# Patient Record
Sex: FEMALE | Race: WHITE | NOT HISPANIC OR LATINO | Employment: OTHER | URBAN - METROPOLITAN AREA
[De-identification: names, ages, dates, MRNs, and addresses within clinical notes are randomized per-mention and may not be internally consistent; named-entity substitution may affect disease eponyms.]

---

## 2019-05-17 ENCOUNTER — HOSPITAL ENCOUNTER (OUTPATIENT)
Facility: MEDICAL CENTER | Age: 84
End: 2019-05-17
Attending: NURSE PRACTITIONER
Payer: COMMERCIAL

## 2019-05-17 ENCOUNTER — OFFICE VISIT (OUTPATIENT)
Dept: URGENT CARE | Facility: CLINIC | Age: 84
End: 2019-05-17
Payer: COMMERCIAL

## 2019-05-17 VITALS
OXYGEN SATURATION: 96 % | WEIGHT: 114 LBS | DIASTOLIC BLOOD PRESSURE: 78 MMHG | TEMPERATURE: 98.4 F | HEART RATE: 75 BPM | RESPIRATION RATE: 16 BRPM | HEIGHT: 60 IN | BODY MASS INDEX: 22.38 KG/M2 | SYSTOLIC BLOOD PRESSURE: 146 MMHG

## 2019-05-17 DIAGNOSIS — R31.9 URINARY TRACT INFECTION WITH HEMATURIA, SITE UNSPECIFIED: Primary | ICD-10-CM

## 2019-05-17 DIAGNOSIS — R39.9 UTI SYMPTOMS: ICD-10-CM

## 2019-05-17 DIAGNOSIS — N39.0 URINARY TRACT INFECTION WITH HEMATURIA, SITE UNSPECIFIED: Primary | ICD-10-CM

## 2019-05-17 LAB
APPEARANCE UR: CLEAR
BILIRUB UR STRIP-MCNC: NORMAL MG/DL
COLOR UR AUTO: YELLOW
GLUCOSE UR STRIP.AUTO-MCNC: NORMAL MG/DL
KETONES UR STRIP.AUTO-MCNC: NORMAL MG/DL
LEUKOCYTE ESTERASE UR QL STRIP.AUTO: NORMAL
NITRITE UR QL STRIP.AUTO: NORMAL
PH UR STRIP.AUTO: 7.5 [PH] (ref 5–8)
PROT UR QL STRIP: NORMAL MG/DL
RBC UR QL AUTO: NORMAL
SP GR UR STRIP.AUTO: 1.01
UROBILINOGEN UR STRIP-MCNC: 0.2 MG/DL

## 2019-05-17 PROCEDURE — 99204 OFFICE O/P NEW MOD 45 MIN: CPT | Performed by: NURSE PRACTITIONER

## 2019-05-17 PROCEDURE — 81002 URINALYSIS NONAUTO W/O SCOPE: CPT | Performed by: NURSE PRACTITIONER

## 2019-05-17 RX ORDER — CEPHALEXIN 500 MG/1
500 CAPSULE ORAL 2 TIMES DAILY
Qty: 14 CAP | Refills: 0 | Status: SHIPPED | OUTPATIENT
Start: 2019-05-17 | End: 2019-05-24

## 2019-05-17 ASSESSMENT — ENCOUNTER SYMPTOMS: EMPTYING BLADDER: 1

## 2019-05-18 LAB
FORWARD REASON: SPWHY: NORMAL
FORWARDED TO LAB: SPWHR: NORMAL
SPECIMEN SENT: SPWT1: NORMAL

## 2019-05-18 ASSESSMENT — ENCOUNTER SYMPTOMS
VOMITING: 0
ABDOMINAL PAIN: 1
NAUSEA: 0
FEVER: 0
SHORTNESS OF BREATH: 0
CARDIOVASCULAR NEGATIVE: 1
RESPIRATORY NEGATIVE: 1
NEUROLOGICAL NEGATIVE: 1
CONSTITUTIONAL NEGATIVE: 1
FLANK PAIN: 0

## 2019-05-18 NOTE — PROGRESS NOTES
Subjective:     Aleah Wiggins is a 84 y.o. female who presents for Cystitis (burning,bleeding,x2 days )        Cystitis    This is a new problem. The problem has been gradually worsening. The quality of the pain is described as burning. The pain is mild. Associated symptoms include hematuria and urgency. Pertinent negatives include no flank pain, nausea or vomiting.     Patient reporting that about 2 days ago she started to experience urinary discomfort. Reports burning on urination and suprapubic abdominal pain. Also reports that this evening she started to notice some blood in her urine which has been starting to subside. Reports a history of UTI.    PMH:  has no past medical history on file.    MEDS:   Current Outpatient Prescriptions:   •  apixaban (ELIQUIS) 2.5mg Tab, Take 2.5 mg by mouth 2 Times a Day., Disp: , Rfl:   •  cephALEXin (KEFLEX) 500 MG Cap, Take 1 Cap by mouth 2 times a day for 7 days., Disp: 14 Cap, Rfl: 0    ALLERGIES: No Known Allergies    SURGHX: History reviewed. No pertinent surgical history.    SOCHX:  reports that she has never smoked. She does not have any smokeless tobacco history on file. She reports that she does not drink alcohol.     FH: Reviewed with patient, not pertinent to this visit.     Review of Systems   Constitutional: Negative.  Negative for fever.   Respiratory: Negative.  Negative for shortness of breath.    Cardiovascular: Negative.    Gastrointestinal: Positive for abdominal pain. Negative for nausea and vomiting.   Genitourinary: Positive for hematuria and urgency. Negative for flank pain.   Neurological: Negative.    All other systems reviewed and are negative.    Objective:     /78 (BP Location: Right arm, Patient Position: Sitting)   Pulse 75   Temp 36.9 °C (98.4 °F) (Temporal)   Resp 16   Ht 1.524 m (5')   Wt 51.7 kg (114 lb)   SpO2 96%   BMI 22.26 kg/m²     Physical Exam   Constitutional: She is oriented to person, place, and time. She appears  well-developed and well-nourished. She is cooperative.  Non-toxic appearance. No distress.   HENT:   Right Ear: External ear normal.   Left Ear: External ear normal.   Nose: Nose normal.   Mouth/Throat: Mucous membranes are normal.   Eyes: Conjunctivae and EOM are normal.   Neck: Normal range of motion.   Cardiovascular: Normal rate, regular rhythm, normal heart sounds and normal pulses.    Pulmonary/Chest: Effort normal and breath sounds normal. No respiratory distress. She has no decreased breath sounds.   Abdominal: Soft. Bowel sounds are normal. There is tenderness in the suprapubic area. There is no CVA tenderness.   Musculoskeletal: Normal range of motion. She exhibits no deformity.   Neurological: She is alert and oriented to person, place, and time. She has normal strength. No sensory deficit.   Skin: Skin is warm, dry and intact. Capillary refill takes less than 2 seconds.   Psychiatric: She has a normal mood and affect. Her behavior is normal.   Vitals reviewed.    UA:    Component Results     Component Value Ref Range & Units Status   POC Color YELLOW  Negative Final   POC Appearance CLEAR  Negative Final   POC Leukocyte Esterase MODERATE  Negative Final   POC Nitrites NEG  Negative Final   POC Urobiligen 0.2  Negative (0.2) mg/dL Final   POC Protein NEG  Negative mg/dL Final   POC Urine PH 7.5  5.0 - 8.0 Final   POC Blood LARGE  Negative Final   POC Specific Gravity 1.010  <1.005 - >1.030 Final   POC Ketones NEG  Negative mg/dL Final   POC Bilirubin NEG  Negative mg/dL Final   POC Glucose NEG  Negative mg/dL Final        Assessment/Plan:     1. Urinary tract infection with hematuria, site unspecified  - URINE CULTURE(NEW); Future  - cephALEXin (KEFLEX) 500 MG Cap; Take 1 Cap by mouth 2 times a day for 7 days.  Dispense: 14 Cap; Refill: 0    2. UTI symptoms  - POCT Urinalysis    UA positive for large blood and moderate LE. Patient reports of dysuria and suprapubic discomfort. Rx given for Keflex. Urine  culture pending. Discussed close monitoring and supportive measures including increasing fluids and rest as well as OTC symptom management including acetaminophen and/or ibuprofen PRN pain and/or fever.     Patient advised to: Return if symptoms worsen or fail to improve, for 1) Symptoms don't improve or worsen, or go to ER, 2) Follow up with primary care in 7-10 days.    Differential diagnosis, natural history, supportive care, and indications for immediate follow-up discussed. All questions answered. Patient agrees with the plan of care.

## 2021-01-14 DIAGNOSIS — Z23 NEED FOR VACCINATION: ICD-10-CM

## 2023-08-17 ENCOUNTER — HOSPITAL ENCOUNTER (EMERGENCY)
Facility: MEDICAL CENTER | Age: 88
End: 2023-08-18
Attending: EMERGENCY MEDICINE | Admitting: STUDENT IN AN ORGANIZED HEALTH CARE EDUCATION/TRAINING PROGRAM
Payer: COMMERCIAL

## 2023-08-17 DIAGNOSIS — R32 URINARY INCONTINENCE, UNSPECIFIED TYPE: ICD-10-CM

## 2023-08-17 DIAGNOSIS — R53.1 GENERALIZED WEAKNESS: ICD-10-CM

## 2023-08-17 PROCEDURE — 84484 ASSAY OF TROPONIN QUANT: CPT

## 2023-08-17 PROCEDURE — 36415 COLL VENOUS BLD VENIPUNCTURE: CPT

## 2023-08-17 PROCEDURE — 85610 PROTHROMBIN TIME: CPT

## 2023-08-17 PROCEDURE — 99285 EMERGENCY DEPT VISIT HI MDM: CPT

## 2023-08-17 PROCEDURE — 83880 ASSAY OF NATRIURETIC PEPTIDE: CPT

## 2023-08-17 PROCEDURE — 83690 ASSAY OF LIPASE: CPT

## 2023-08-17 PROCEDURE — 85730 THROMBOPLASTIN TIME PARTIAL: CPT

## 2023-08-17 PROCEDURE — 85025 COMPLETE CBC W/AUTO DIFF WBC: CPT

## 2023-08-17 PROCEDURE — 84443 ASSAY THYROID STIM HORMONE: CPT

## 2023-08-17 PROCEDURE — 80053 COMPREHEN METABOLIC PANEL: CPT

## 2023-08-18 ENCOUNTER — APPOINTMENT (OUTPATIENT)
Dept: RADIOLOGY | Facility: MEDICAL CENTER | Age: 88
End: 2023-08-18
Attending: EMERGENCY MEDICINE
Payer: COMMERCIAL

## 2023-08-18 VITALS
SYSTOLIC BLOOD PRESSURE: 98 MMHG | RESPIRATION RATE: 16 BRPM | OXYGEN SATURATION: 97 % | HEART RATE: 77 BPM | DIASTOLIC BLOOD PRESSURE: 68 MMHG | HEIGHT: 58 IN | TEMPERATURE: 97.2 F | WEIGHT: 121.69 LBS | BODY MASS INDEX: 25.54 KG/M2

## 2023-08-18 PROBLEM — R53.1 WEAKNESS: Status: ACTIVE | Noted: 2023-08-18

## 2023-08-18 LAB
ALBUMIN SERPL BCP-MCNC: 4.1 G/DL (ref 3.2–4.9)
ALBUMIN/GLOB SERPL: 1.5 G/DL
ALP SERPL-CCNC: 77 U/L (ref 30–99)
ALT SERPL-CCNC: 16 U/L (ref 2–50)
ANION GAP SERPL CALC-SCNC: 12 MMOL/L (ref 7–16)
APPEARANCE UR: CLEAR
APTT PPP: 27.6 SEC (ref 24.7–36)
AST SERPL-CCNC: 19 U/L (ref 12–45)
BASOPHILS # BLD AUTO: 0.3 % (ref 0–1.8)
BASOPHILS # BLD: 0.02 K/UL (ref 0–0.12)
BILIRUB SERPL-MCNC: 0.3 MG/DL (ref 0.1–1.5)
BILIRUB UR QL STRIP.AUTO: NEGATIVE
BUN SERPL-MCNC: 21 MG/DL (ref 8–22)
CALCIUM ALBUM COR SERPL-MCNC: 9.5 MG/DL (ref 8.5–10.5)
CALCIUM SERPL-MCNC: 9.6 MG/DL (ref 8.5–10.5)
CHLORIDE SERPL-SCNC: 106 MMOL/L (ref 96–112)
CO2 SERPL-SCNC: 25 MMOL/L (ref 20–33)
COLOR UR: YELLOW
CREAT SERPL-MCNC: 0.82 MG/DL (ref 0.5–1.4)
EKG IMPRESSION: NORMAL
EOSINOPHIL # BLD AUTO: 0.06 K/UL (ref 0–0.51)
EOSINOPHIL NFR BLD: 1 % (ref 0–6.9)
ERYTHROCYTE [DISTWIDTH] IN BLOOD BY AUTOMATED COUNT: 50.1 FL (ref 35.9–50)
GFR SERPLBLD CREATININE-BSD FMLA CKD-EPI: 68 ML/MIN/1.73 M 2
GLOBULIN SER CALC-MCNC: 2.7 G/DL (ref 1.9–3.5)
GLUCOSE SERPL-MCNC: 94 MG/DL (ref 65–99)
GLUCOSE UR STRIP.AUTO-MCNC: NEGATIVE MG/DL
HCT VFR BLD AUTO: 38.3 % (ref 37–47)
HGB BLD-MCNC: 12.3 G/DL (ref 12–16)
IMM GRANULOCYTES # BLD AUTO: 0.01 K/UL (ref 0–0.11)
IMM GRANULOCYTES NFR BLD AUTO: 0.2 % (ref 0–0.9)
INR PPP: 1.05 (ref 0.87–1.13)
KETONES UR STRIP.AUTO-MCNC: NEGATIVE MG/DL
LACTATE SERPL-SCNC: 0.8 MMOL/L (ref 0.5–2)
LEUKOCYTE ESTERASE UR QL STRIP.AUTO: NEGATIVE
LIPASE SERPL-CCNC: 28 U/L (ref 11–82)
LIPASE SERPL-CCNC: 30 U/L (ref 11–82)
LYMPHOCYTES # BLD AUTO: 1.59 K/UL (ref 1–4.8)
LYMPHOCYTES NFR BLD: 26.2 % (ref 22–41)
MCH RBC QN AUTO: 29.6 PG (ref 27–33)
MCHC RBC AUTO-ENTMCNC: 32.1 G/DL (ref 32.2–35.5)
MCV RBC AUTO: 92.3 FL (ref 81.4–97.8)
MICRO URNS: NORMAL
MONOCYTES # BLD AUTO: 0.67 K/UL (ref 0–0.85)
MONOCYTES NFR BLD AUTO: 11.1 % (ref 0–13.4)
NEUTROPHILS # BLD AUTO: 3.71 K/UL (ref 1.82–7.42)
NEUTROPHILS NFR BLD: 61.2 % (ref 44–72)
NITRITE UR QL STRIP.AUTO: NEGATIVE
NRBC # BLD AUTO: 0 K/UL
NRBC BLD-RTO: 0 /100 WBC (ref 0–0.2)
NT-PROBNP SERPL IA-MCNC: 1372 PG/ML (ref 0–125)
NT-PROBNP SERPL IA-MCNC: 1504 PG/ML (ref 0–125)
PH UR STRIP.AUTO: 6.5 [PH] (ref 5–8)
PLATELET # BLD AUTO: 216 K/UL (ref 164–446)
PMV BLD AUTO: 11.5 FL (ref 9–12.9)
POTASSIUM SERPL-SCNC: 4.3 MMOL/L (ref 3.6–5.5)
PROT SERPL-MCNC: 6.8 G/DL (ref 6–8.2)
PROT UR QL STRIP: NEGATIVE MG/DL
PROTHROMBIN TIME: 13.6 SEC (ref 12–14.6)
RBC # BLD AUTO: 4.15 M/UL (ref 4.2–5.4)
RBC UR QL AUTO: NEGATIVE
SODIUM SERPL-SCNC: 143 MMOL/L (ref 135–145)
SP GR UR STRIP.AUTO: 1.01
TROPONIN T SERPL-MCNC: 19 NG/L (ref 6–19)
TROPONIN T SERPL-MCNC: 20 NG/L (ref 6–19)
TSH SERPL DL<=0.005 MIU/L-ACNC: 1.55 UIU/ML (ref 0.38–5.33)
UROBILINOGEN UR STRIP.AUTO-MCNC: 0.2 MG/DL
WBC # BLD AUTO: 6.1 K/UL (ref 4.8–10.8)

## 2023-08-18 PROCEDURE — 87040 BLOOD CULTURE FOR BACTERIA: CPT | Mod: 91

## 2023-08-18 PROCEDURE — 97166 OT EVAL MOD COMPLEX 45 MIN: CPT

## 2023-08-18 PROCEDURE — 83690 ASSAY OF LIPASE: CPT

## 2023-08-18 PROCEDURE — 93005 ELECTROCARDIOGRAM TRACING: CPT | Performed by: EMERGENCY MEDICINE

## 2023-08-18 PROCEDURE — 81003 URINALYSIS AUTO W/O SCOPE: CPT

## 2023-08-18 PROCEDURE — 97163 PT EVAL HIGH COMPLEX 45 MIN: CPT

## 2023-08-18 PROCEDURE — 99282 EMERGENCY DEPT VISIT SF MDM: CPT | Performed by: STUDENT IN AN ORGANIZED HEALTH CARE EDUCATION/TRAINING PROGRAM

## 2023-08-18 PROCEDURE — 84484 ASSAY OF TROPONIN QUANT: CPT

## 2023-08-18 PROCEDURE — 83605 ASSAY OF LACTIC ACID: CPT

## 2023-08-18 PROCEDURE — 71045 X-RAY EXAM CHEST 1 VIEW: CPT

## 2023-08-18 PROCEDURE — 83880 ASSAY OF NATRIURETIC PEPTIDE: CPT

## 2023-08-18 PROCEDURE — 70450 CT HEAD/BRAIN W/O DYE: CPT

## 2023-08-18 RX ORDER — FUROSEMIDE 20 MG/1
20 TABLET ORAL DAILY
COMMUNITY

## 2023-08-18 RX ORDER — ASPIRIN 81 MG/1
81 TABLET ORAL
COMMUNITY

## 2023-08-18 RX ORDER — POTASSIUM CHLORIDE 750 MG/1
10 TABLET, EXTENDED RELEASE ORAL DAILY
COMMUNITY

## 2023-08-18 RX ORDER — ATORVASTATIN CALCIUM 20 MG/1
20 TABLET, FILM COATED ORAL NIGHTLY
COMMUNITY

## 2023-08-18 ASSESSMENT — COGNITIVE AND FUNCTIONAL STATUS - GENERAL
EATING MEALS: A LITTLE
CLIMB 3 TO 5 STEPS WITH RAILING: A LITTLE
WALKING IN HOSPITAL ROOM: A LITTLE
TURNING FROM BACK TO SIDE WHILE IN FLAT BAD: A LITTLE
HELP NEEDED FOR BATHING: A LOT
SUGGESTED CMS G CODE MODIFIER MOBILITY: CK
MOBILITY SCORE: 18
MOVING FROM LYING ON BACK TO SITTING ON SIDE OF FLAT BED: A LITTLE
SUGGESTED CMS G CODE MODIFIER DAILY ACTIVITY: CK
DRESSING REGULAR UPPER BODY CLOTHING: A LITTLE
PERSONAL GROOMING: A LITTLE
DRESSING REGULAR LOWER BODY CLOTHING: A LOT
MOVING TO AND FROM BED TO CHAIR: A LITTLE
TOILETING: A LOT
DAILY ACTIVITIY SCORE: 15
STANDING UP FROM CHAIR USING ARMS: A LITTLE

## 2023-08-18 ASSESSMENT — GAIT ASSESSMENTS
DEVIATION: BRADYKINETIC
DISTANCE (FEET): 100
ASSISTIVE DEVICE: FRONT WHEEL WALKER
GAIT LEVEL OF ASSIST: STANDBY ASSIST

## 2023-08-18 NOTE — DISCHARGE PLANNING
MSW voalted PT/OT to come see pt. MSW called pt's  Baljinder. He will be the ER around 10:30. MSW will meet with Baljinder when he arrives to discuss d/c plan.

## 2023-08-18 NOTE — ED PROVIDER NOTES
ED Provider Note    CHIEF COMPLAINT  Chief Complaint   Patient presents with    Incontinence     Pt states she has lost all control of her bladder x2-3 days.     Weakness     Progressively increasing weakness over the past 3 months.         EXTERNAL RECORDS REVIEWED  Outpatient Notes Office note 5/17/19    HPI/ROS  LIMITATION TO HISTORY   Select: : None  OUTSIDE HISTORIAN(S):  Significant other     Aleah Wiggins is a 89 y.o. female who presents to the emergency department for evaluation of incontinence and weakness.  History is mainly obtained from the patient's  who is at bedside as she does have a history of dementia and her baseline mental status is alert and oriented x1.  The  states that over the last 1 to 2 weeks the patient has been having urinary incontinence.  She has been having generalized fatigue and weakness as well.  He has not noticed that she has had any fevers.  She has not complained of chest pain or shortness of breath.  He denies any focal neurodeficits.  She does have a history of strokes and was initially on blood thinners but now has a watchman.  He denies any traumatic injuries or falls.  She has not had any vomiting or diarrhea.  She has not had any coughing or wheezing.    PAST MEDICAL HISTORY  CVA s/p watchman    SURGICAL HISTORY  patient denies any surgical history    FAMILY HISTORY  No family history on file.    SOCIAL HISTORY  Social History     Tobacco Use    Smoking status: Never    Smokeless tobacco: Not on file   Substance and Sexual Activity    Alcohol use: No    Drug use: Not on file    Sexual activity: Not on file     CURRENT MEDICATIONS  Home Medications       Reviewed by Ashlee Matos R.N. (Registered Nurse) on 08/17/23 at 2027  Med List Status: Partial     Medication Last Dose Status   apixaban (ELIQUIS) 2.5mg Tab  Active                  ALLERGIES  No Known Allergies    PHYSICAL EXAM  VITAL SIGNS: BP (!) 162/76   Pulse 69   Temp 36.3 °C (97.4 °F)  "(Temporal)   Resp 18   Ht 1.473 m (4' 10\")   Wt 55.2 kg (121 lb 11.1 oz)   SpO2 97%   BMI 25.43 kg/m²   Constitutional: Alert and in no apparent distress.  HENT: Normocephalic atraumatic. Bilateral external ears normal. Nose normal. Mucous membranes are moist.  Eyes: Pupils are equal and reactive. Conjunctiva normal. Non-icteric sclera.   Neck: Normal range of motion without tenderness. Supple. No meningeal signs.  Cardiovascular: Regular rate and rhythm. No murmurs, gallops or rubs.  Thorax & Lungs: No retractions, nasal flaring, or tachypnea. Breath sounds are clear to auscultation bilaterally. No wheezing, rhonchi or rales.  Abdomen: Soft, nontender and nondistended. No hepatosplenomegaly.  Skin: Warm and dry. No rashes are noted.  Back: No bony tenderness, No CVA tenderness.   Extremities: 2+ peripheral pulses. Cap refill is less than 2 seconds. No edema, cyanosis, or clubbing.  Musculoskeletal: Good range of motion in all major joints. No tenderness to palpation or major deformities noted.   Neurologic: Alert and oriented x1. The patient moves all 4 extremities without obvious deficits.    DIAGNOSTIC STUDIES / PROCEDURES  EKG  I have independently interpreted this EKG  Results for orders placed or performed during the hospital encounter of 23   EKG (NOW)   Result Value Ref Range    Report       [facility] Emergency Dept.    Test Date:  2023  Pt Name:    CARSON LUKE                  Department: ER  MRN:        [id number]                      Room:       [facility]  Gender:     Female                       Technician: [id number]  :        1934                   Requested By:TILA LOW  Order #:    [id number]                    Reading MD: Tila Low    Measurements  Intervals                                Axis  Rate:       63                           P:          0  NV:         0                            QRS:        16  QRSD:       76                           T:          " 31  QT:         415  QTc:        425    Interpretive Statements  Atrial fibrillation  Left ventricular hypertrophy  Anterior Q waves, possibly due to LVH  No previous ECG available for comparison  Electronically Signed On 08- 01:56:12 PDT by Tila Colmenares       LABS  Results for orders placed or performed during the hospital encounter of 08/17/23   CBC WITH DIFFERENTIAL   Result Value Ref Range    WBC 6.1 4.8 - 10.8 K/uL    RBC 4.15 (L) 4.20 - 5.40 M/uL    Hemoglobin 12.3 12.0 - 16.0 g/dL    Hematocrit 38.3 37.0 - 47.0 %    MCV 92.3 81.4 - 97.8 fL    MCH 29.6 27.0 - 33.0 pg    MCHC 32.1 (L) 32.2 - 35.5 g/dL    RDW 50.1 (H) 35.9 - 50.0 fL    Platelet Count 216 164 - 446 K/uL    MPV 11.5 9.0 - 12.9 fL    Neutrophils-Polys 61.20 44.00 - 72.00 %    Lymphocytes 26.20 22.00 - 41.00 %    Monocytes 11.10 0.00 - 13.40 %    Eosinophils 1.00 0.00 - 6.90 %    Basophils 0.30 0.00 - 1.80 %    Immature Granulocytes 0.20 0.00 - 0.90 %    Nucleated RBC 0.00 0.00 - 0.20 /100 WBC    Neutrophils (Absolute) 3.71 1.82 - 7.42 K/uL    Lymphs (Absolute) 1.59 1.00 - 4.80 K/uL    Monos (Absolute) 0.67 0.00 - 0.85 K/uL    Eos (Absolute) 0.06 0.00 - 0.51 K/uL    Baso (Absolute) 0.02 0.00 - 0.12 K/uL    Immature Granulocytes (abs) 0.01 0.00 - 0.11 K/uL    NRBC (Absolute) 0.00 K/uL   COMP METABOLIC PANEL   Result Value Ref Range    Sodium 143 135 - 145 mmol/L    Potassium 4.3 3.6 - 5.5 mmol/L    Chloride 106 96 - 112 mmol/L    Co2 25 20 - 33 mmol/L    Anion Gap 12.0 7.0 - 16.0    Glucose 94 65 - 99 mg/dL    Bun 21 8 - 22 mg/dL    Creatinine 0.82 0.50 - 1.40 mg/dL    Calcium 9.6 8.5 - 10.5 mg/dL    Correct Calcium 9.5 8.5 - 10.5 mg/dL    AST(SGOT) 19 12 - 45 U/L    ALT(SGPT) 16 2 - 50 U/L    Alkaline Phosphatase 77 30 - 99 U/L    Total Bilirubin 0.3 0.1 - 1.5 mg/dL    Albumin 4.1 3.2 - 4.9 g/dL    Total Protein 6.8 6.0 - 8.2 g/dL    Globulin 2.7 1.9 - 3.5 g/dL    A-G Ratio 1.5 g/dL   TSH   Result Value Ref Range    TSH 1.550 0.380 - 5.330  uIU/mL   LACTIC ACID   Result Value Ref Range    Lactic Acid 0.8 0.5 - 2.0 mmol/L   URINALYSIS CULTURE, IF INDICATED    Specimen: Urine, Cath   Result Value Ref Range    Color Yellow     Character Clear     Specific Gravity 1.010 <1.035    Ph 6.5 5.0 - 8.0    Glucose Negative Negative mg/dL    Ketones Negative Negative mg/dL    Protein Negative Negative mg/dL    Bilirubin Negative Negative    Urobilinogen, Urine 0.2 Negative    Nitrite Negative Negative    Leukocyte Esterase Negative Negative    Occult Blood Negative Negative    Micro Urine Req see below    PROTHROMBIN TIME (INR)   Result Value Ref Range    PT 13.6 12.0 - 14.6 sec    INR 1.05 0.87 - 1.13   APTT   Result Value Ref Range    APTT 27.6 24.7 - 36.0 sec   LIPASE   Result Value Ref Range    Lipase 30 11 - 82 U/L   proBrain Natriuretic Peptide, NT   Result Value Ref Range    NT-proBNP 1504 (H) 0 - 125 pg/mL   TROPONIN   Result Value Ref Range    Troponin T 19 6 - 19 ng/L   ESTIMATED GFR   Result Value Ref Range    GFR (CKD-EPI) 68 >60 mL/min/1.73 m 2   EKG (NOW)   Result Value Ref Range    Report       [facility] Emergency Dept.    Test Date:  2023  Pt Name:    CARSON LUKE                  Department: ER  MRN:        [id number]                      Room:       [facility]  Gender:     Female                       Technician: [id number]  :        1934                   Requested By:TILA LOW  Order #:    [id number]                    Reading MD: Tila Low    Measurements  Intervals                                Axis  Rate:       63                           P:          0  OH:         0                            QRS:        16  QRSD:       76                           T:          31  QT:         415  QTc:        425    Interpretive Statements  Atrial fibrillation  Left ventricular hypertrophy  Anterior Q waves, possibly due to LVH  No previous ECG available for comparison  Electronically Signed On 2023 01:56:12 PDT by Tila Low        RADIOLOGY  I have independently interpreted the diagnostic imaging associated with this visit and am waiting the final reading from the radiologist.   My preliminary interpretation is as follows: No intracranial hemorrhage noted.  Radiologist interpretation:   CT-HEAD W/O   Final Result         1.  No acute intracranial abnormality is identified, there are nonspecific white matter changes, commonly associated with small vessel ischemic disease.  Associated mild cerebral atrophy is noted.   2.  Atherosclerosis.         DX-CHEST-PORTABLE (1 VIEW)   Final Result         1.  No acute cardiopulmonary disease.   2.  Cardiomegaly   3.  Atherosclerosis        COURSE & MEDICAL DECISION MAKING    ED Observation Status? Yes; I am placing the patient in to an observation status due to a diagnostic uncertainty as well as therapeutic intensity. Patient placed in observation status at 12:09 AM, 8/18/2023.     Observation plan is as follows: EKG, labs, imaging and reassessment    INITIAL ASSESSMENT, COURSE AND PLAN  Care Narrative: This is an 89-year-old female presenting to the emergency department for urinary incontinence and generalized weakness.  On initial evaluation, the patient did not appear to be in any acute distress.  Her vital signs were normal and reassuring.  Physical exam was overall reassuring.  She moves all 4 extremities equally and had no focal deficits concerning for stroke.  She was alert and oriented x1 but has been stated that this is at her baseline.  No other abnormalities were noted.    EKG did not show any evidence of acute ischemia.  Troponin was normal and she denied any chest pain or shortness of breath.  I have extremely low clinical suspicion for ACS at this point.    BNP was elevated but no evidence of pulmonary edema was noted on chest x-ray and she had no evidence of hypoxia on her vital signs.  I am less concern for CHF.    White count and lactic acid were normal.  She had no history of  fevers.  I am less concern for sepsis or serious bacterial infection.    Urinalysis was actually completely clean with no evidence of infection or blood.    Electrolytes were completely normal with no derangements.  H&H were normal with no evidence of anemia.    CT head was clear with no evidence of intracranial hemorrhage or mass.    2:34 AM - I discussed the case with Dr Anna, hospitalist. The plan was made to place the patient in ED obs and get a PT/OT consult in the ED.     3:07 AM - The patient was signed out to MIN Gonsales, pending PT/OT evaluation in the morning.    ADDITIONAL PROBLEM LIST  Generalized weakness, urinary incontinence  DISPOSITION AND DISCUSSIONS  I have discussed management of the patient with the following physicians and BRADEN's:  Dr Anna, hospitalist.     Discussion of management with other QHP or appropriate source(s): None     Escalation of care considered, and ultimately not performed:blood analysis and diagnostic imaging    Barriers to care at this time, including but not limited to:  None .     Decision tools and prescription drugs considered including, but not limited to:  None .    FINAL IMPRESSION  1. Generalized weakness      Electronically signed by: Tila Colmenares D.O., 8/17/2023 11:48 PM

## 2023-08-18 NOTE — ED TRIAGE NOTES
"Chief Complaint   Patient presents with    Incontinence     Pt states she has lost all control of her bladder x2-3 days.     Weakness     Progressively increasing weakness over the past 3 months.       Pt ambulatory to triage with above complaints. Pt is accompanied by her  and caretaker. Pt is a poor historian. Pt is intermittently confused during triage, which is baseline per  since pt's stroke in 2017.  states pt has never had issues with incontinence before. Pt denies any abdominal pain, burning, or foul odor. Pt and  state pt is becoming more weak over the past 3 months.     Protocol ordered. Pt educated on triage process, placed back in lobby, and instructed to inform staff of any changes.     /65   Pulse 69   Temp 36.4 °C (97.6 °F) (Temporal)   Resp 14   Ht 1.473 m (4' 10\")   Wt 55.2 kg (121 lb 11.1 oz)   SpO2 98%   BMI 25.43 kg/m²     "

## 2023-08-18 NOTE — DISCHARGE PLANNING
MSW spoke to PT/OT. MSW met with pt's  to discuss PT/OT eval and options.Pt does not qualify for SNF. Possibly Home Health.  Per pt's  they just sold their home and are moving to [place] in 2 weeks. Pt's PCP is out of [place] as well. Pt's  stated he already has been looking at assisted livings in [place] and will pursue placement when they get there. MSW offered resources and placement options but per  since they are moving it would not benefit them. They do have relatives coming to assist with the move as well. Pt's  agreeable to take pt home in the mean time and care for her till they go to [place]. MSW updated ERP on conversation with pt's .

## 2023-08-18 NOTE — ED NOTES
Pt to restroom with  assisting, unable to provide urine sample at this time but aware of need for sample.

## 2023-08-18 NOTE — DISCHARGE INSTRUCTIONS
You were seen in the ER for weakness and incontinence.  Our hospital doctor evaluated you and did not feel that there was any indication for admission.  Given that you are moving from the area in 2 weeks her  was unable to find placement for you.  You have indicated that you are comfortable going home with your  and I have discharged you.  However, if you have any new or worsening symptoms I would like you to come back immediately to the ER.

## 2023-08-18 NOTE — CONSULTS
Hospital Medicine Consultation    Date of Service  8/18/2023    Referring Physician  Tila Colmenares D.O.    Consulting Physician  Sourav Anna M.D.    Reason for Consultation  Weakness and placement  History of Presenting Illness  89 y.o. female with reported cognitive decline alert, awake, oriented to self only who presented 8/17/2023 with weakness and incontinence.  History is obtained entirely from chart review as patient is poor historian and only oriented to self.  In the ER, work-up did not reveal any indication for admission.  UA negative.  Hospital medicine consulted for weakness and subsequent placement.  PT/OT ordered by ER physician.    I discussed with case management patient does not meet criteria for inpatient hospitalization.  Patient remained under ED observation under ER team.    Review of Systems  Review of Systems   Unable to perform ROS: Dementia       Past Medical History   has no past medical history on file.    Surgical History   has no past surgical history on file.    Family History  family history is not on file.    Social History   reports that she has never smoked. She does not have any smokeless tobacco history on file. She reports that she does not drink alcohol.          Allergies  No Known Allergies    Physical Exam  Temp:  [36.3 °C (97.4 °F)-36.4 °C (97.6 °F)] 36.3 °C (97.4 °F)  Pulse:  [69-72] 69  Resp:  [14-18] 18  BP: (133-162)/(65-83) 162/76  SpO2:  [97 %-98 %] 97 %    Physical Exam  Vitals and nursing note reviewed.   HENT:      Head: Normocephalic and atraumatic.      Right Ear: Tympanic membrane normal.      Left Ear: Tympanic membrane normal.      Nose: Nose normal.      Mouth/Throat:      Mouth: Mucous membranes are moist.      Pharynx: Oropharynx is clear.   Eyes:      Extraocular Movements: Extraocular movements intact.      Pupils: Pupils are equal, round, and reactive to light.   Cardiovascular:      Rate and Rhythm: Normal rate and regular rhythm.      Pulses: Normal  pulses.      Heart sounds: Normal heart sounds.   Pulmonary:      Effort: Pulmonary effort is normal.      Breath sounds: Normal breath sounds.   Abdominal:      General: Bowel sounds are normal. There is no distension.      Palpations: Abdomen is soft. There is no mass.   Musculoskeletal:         General: Normal range of motion.      Cervical back: Neck supple.   Skin:     General: Skin is warm.      Capillary Refill: Capillary refill takes less than 2 seconds.   Neurological:      Mental Status: She is alert. Mental status is at baseline. She is disoriented.      Comments: Only oriented to self   Psychiatric:         Mood and Affect: Mood normal.         Behavior: Behavior normal.         Fluids      Laboratory  Recent Labs     08/17/23  2356   WBC 6.1   RBC 4.15*   HEMOGLOBIN 12.3   HEMATOCRIT 38.3   MCV 92.3   MCH 29.6   MCHC 32.1*   RDW 50.1*   PLATELETCT 216   MPV 11.5     Recent Labs     08/17/23  2356   SODIUM 143   POTASSIUM 4.3   CHLORIDE 106   CO2 25   GLUCOSE 94   BUN 21   CREATININE 0.82   CALCIUM 9.6     Recent Labs     08/17/23  2356   APTT 27.6   INR 1.05                 Imaging  CT-HEAD W/O   Final Result         1.  No acute intracranial abnormality is identified, there are nonspecific white matter changes, commonly associated with small vessel ischemic disease.  Associated mild cerebral atrophy is noted.   2.  Atherosclerosis.         DX-CHEST-PORTABLE (1 VIEW)   Final Result         1.  No acute cardiopulmonary disease.   2.  Cardiomegaly   3.  Atherosclerosis          Assessment/Plan  Weakness  Assessment & Plan  CT head negative for acute intracranial mass or bleed.  Chest x-ray negative for acute consolidation or effusion.  UA negative.  No indication for inpatient hospitalization.  Discussed with case management.  Patient remained under ED abs in the ER team.  PT/OT  Case management social work to be consulted by ER team for placement if needed

## 2023-08-18 NOTE — THERAPY
"Occupational Therapy   Initial Evaluation     Patient Name: Aleah Wiggins  Age:  89 y.o., Sex:  female  Medical Record #: [id number]  Today's Date: 8/18/2023     Precautions  Precautions: Fall Risk    Assessment  Patient is an 89 y.o. female who presented to the ED with weakness, incontinence and cognitive decline. Pt with a h/o dementia and is A&O x 1 at baseline. During OT eval, pt with intact strength, balance and coordination. Pt limited by cognition. Attempted to assess toileting during OT eval, but pt declined to sit on the toilet after walking to the bathroom and appeared concerned about privacy and using a public bathroom. Upon questioning if she felt the urge to pee, she responded with \"I want a small dog.\" At this time, pt with no skilled OT needs in the acute care setting.     Patient will not be actively followed for occupational therapy services at this time, however may be seen if requested by physician for 1 more visit during this hospital stay to address any discharge or equipment needs.       Plan    Occupational Therapy Initial Treatment Plan   Duration: Discharge Needs Only    DC Equipment Recommendations: Unable to determine at this time  Discharge Recommendations: Recommend home health for continued occupational therapy services (to help with equipment recommendations and caregiver training)     Subjective    Very pleasant and agreeable     Objective       08/18/23 0955   Prior Living Situation   Housing / Facility 1 Story House   Lives with - Patient's Self Care Capacity Spouse   Comments Pt unable to provide information on living situation or PLOF as she is A&O x 1. Spouse will be in today to meet with SW   Prior Level of ADL Function   Comments unable to determine   Precautions   Precautions Fall Risk   Vitals   O2 Delivery Device None - Room Air   Pain   Pain Scales 0 to 10 Scale    Pain 0 - 10 Group   Therapist Pain Assessment During Activity;Nurse Notified  (denies any pain)   Cognition  " "  Cognition / Consciousness X   Comments oriented to person. When asked other orientation questions, she provided answers such as \"I grew up in [place]\" or \"I want a small dog\"   Active ROM Upper Body   Active ROM Upper Body  WDL   Dominant Hand Right   Strength Upper Body   Upper Body Strength  WDL   Upper Body Muscle Tone   Upper Body Muscle Tone  WDL   Coordination Upper Body   Coordination WDL   Balance Assessment   Sitting Balance (Static) Fair +   Sitting Balance (Dynamic) Fair   Standing Balance (Static) Fair   Standing Balance (Dynamic) Fair   Weight Shift Sitting Fair   Weight Shift Standing Fair   Bed Mobility    Supine to Sit Minimal Assist  (extra time)   Scooting Standby Assist  (extra time)   ADL Assessment   Eating Supervision  (set-up)   Upper Body Dressing Minimal Assist  (donned her jacket for walking in the green)   Lower Body Dressing Maximal Assist  (assist to don shoes but able to doff with supervision)   Toileting   (walked to BR but then refused to use toilet due to unfamiliar environment/public restroom)   6 Clicks Daily Activity Score 15   Functional Mobility   Sit to Stand Standby Assist   Bed, Chair, Wheelchair Transfer Standby Assist   Mobility walked to/from hallway bathroom with FWW, no LOB noted, no assist required   Education Group   Education Provided Role of Occupational Therapist   Role of Occupational Therapist Patient Response Patient;Acceptance;Explanation;No Learning Evidence   Occupational Therapy Initial Treatment Plan    Duration Discharge Needs Only   Problem List   Problem List Impaired Cognitive Function                   "

## 2023-08-18 NOTE — ASSESSMENT & PLAN NOTE
CT head negative for acute intracranial mass or bleed.  Chest x-ray negative for acute consolidation or effusion.  UA negative.  No indication for inpatient hospitalization.  Discussed with case management.  Patient remained under ED obs in the ER team.  PT/OT  Case management social work to be consulted by ER team for placement if needed

## 2023-08-18 NOTE — ED NOTES
Bedside report received from Eri YEUNG. POC discussed with patient. No needs at this time.      Called pt and lvm letting her know referral was received and office would like to schedule her an appointment. Asked pt to please call us back.

## 2023-08-18 NOTE — DISCHARGE SUMMARY
"  ED Observation Discharge Summary    Patient:Aleah Wiggins  Patient : 1934  Patient MRN: [id number]  Patient PCP: Pcp Pt States None    Admit Date: 2023  Discharge Date and Time: 23 11:40 AM  Discharge Diagnosis:   1. Generalized weakness        2. Urinary incontinence, unspecified type        Discharge Attending: Julio Bellamy M.D.  Discharge Service: ED Observation    ED Course  Aleah Wiggins is a 89 y.o. female who was evaluated at [facility] for weakness and urinary incontinence. She was evaluated by the hospitalist and it was felt she did not meet criteria for inpatient hospitalization. She was kept in the ER under observation and was seen by PT/OT. She was evaluated by  for potential placement options. Patient does not qualify for SNF and  noted that they are moving to [place] in two weeks and he is not interested in placement. Apparently they will have relatives coming in to town to assist with the move. Patient was reevaluated at bedside. She is resting comfortably and has ambulated with a walker. I had a discussion with the patient's  at bedside and he confirms that he is comfortable taking her home. We went over strict return precautions and she was discharged in stable condition.    Discharge Exam:  BP (!) 141/73   Pulse 92   Temp 36.3 °C (97.4 °F) (Temporal)   Resp 16   Ht 1.473 m (4' 10\")   Wt 55.2 kg (121 lb 11.1 oz)   SpO2 95%   BMI 25.43 kg/m² .    Constitutional: Awake and alert. Nontoxic  HENT:  Grossly normal  Eyes: Grossly normal  Neck: Normal range of motion  Cardiovascular: Normal heart rate   Thorax & Lungs: No respiratory distress  Skin:  No pathologic rash.   Extremities: Well perfused  Psychiatric: Pleasant.    Labs  Results for orders placed or performed during the hospital encounter of 23   CBC WITH DIFFERENTIAL   Result Value Ref Range    WBC 6.1 4.8 - 10.8 K/uL    RBC 4.15 (L) 4.20 - 5.40 M/uL    Hemoglobin " 12.3 12.0 - 16.0 g/dL    Hematocrit 38.3 37.0 - 47.0 %    MCV 92.3 81.4 - 97.8 fL    MCH 29.6 27.0 - 33.0 pg    MCHC 32.1 (L) 32.2 - 35.5 g/dL    RDW 50.1 (H) 35.9 - 50.0 fL    Platelet Count 216 164 - 446 K/uL    MPV 11.5 9.0 - 12.9 fL    Neutrophils-Polys 61.20 44.00 - 72.00 %    Lymphocytes 26.20 22.00 - 41.00 %    Monocytes 11.10 0.00 - 13.40 %    Eosinophils 1.00 0.00 - 6.90 %    Basophils 0.30 0.00 - 1.80 %    Immature Granulocytes 0.20 0.00 - 0.90 %    Nucleated RBC 0.00 0.00 - 0.20 /100 WBC    Neutrophils (Absolute) 3.71 1.82 - 7.42 K/uL    Lymphs (Absolute) 1.59 1.00 - 4.80 K/uL    Monos (Absolute) 0.67 0.00 - 0.85 K/uL    Eos (Absolute) 0.06 0.00 - 0.51 K/uL    Baso (Absolute) 0.02 0.00 - 0.12 K/uL    Immature Granulocytes (abs) 0.01 0.00 - 0.11 K/uL    NRBC (Absolute) 0.00 K/uL   COMP METABOLIC PANEL   Result Value Ref Range    Sodium 143 135 - 145 mmol/L    Potassium 4.3 3.6 - 5.5 mmol/L    Chloride 106 96 - 112 mmol/L    Co2 25 20 - 33 mmol/L    Anion Gap 12.0 7.0 - 16.0    Glucose 94 65 - 99 mg/dL    Bun 21 8 - 22 mg/dL    Creatinine 0.82 0.50 - 1.40 mg/dL    Calcium 9.6 8.5 - 10.5 mg/dL    Correct Calcium 9.5 8.5 - 10.5 mg/dL    AST(SGOT) 19 12 - 45 U/L    ALT(SGPT) 16 2 - 50 U/L    Alkaline Phosphatase 77 30 - 99 U/L    Total Bilirubin 0.3 0.1 - 1.5 mg/dL    Albumin 4.1 3.2 - 4.9 g/dL    Total Protein 6.8 6.0 - 8.2 g/dL    Globulin 2.7 1.9 - 3.5 g/dL    A-G Ratio 1.5 g/dL   TSH   Result Value Ref Range    TSH 1.550 0.380 - 5.330 uIU/mL   LIPASE   Result Value Ref Range    Lipase 28 11 - 82 U/L   proBrain Natriuretic Peptide, NT   Result Value Ref Range    NT-proBNP 1372 (H) 0 - 125 pg/mL   TROPONIN   Result Value Ref Range    Troponin T 20 (H) 6 - 19 ng/L   LACTIC ACID   Result Value Ref Range    Lactic Acid 0.8 0.5 - 2.0 mmol/L   BLOOD CULTURE    Specimen: Peripheral; Blood   Result Value Ref Range    Significant Indicator NEG     Source BLD     Site PERIPHERAL     Culture Result       No  Growth  Note: Blood cultures are incubated for 5 days and  are monitored continuously.Positive blood cultures  are called to the RN and reported as soon as  they are identified.     URINALYSIS CULTURE, IF INDICATED    Specimen: Urine, Cath   Result Value Ref Range    Color Yellow     Character Clear     Specific Gravity 1.010 <1.035    Ph 6.5 5.0 - 8.0    Glucose Negative Negative mg/dL    Ketones Negative Negative mg/dL    Protein Negative Negative mg/dL    Bilirubin Negative Negative    Urobilinogen, Urine 0.2 Negative    Nitrite Negative Negative    Leukocyte Esterase Negative Negative    Occult Blood Negative Negative    Micro Urine Req see below    PROTHROMBIN TIME (INR)   Result Value Ref Range    PT 13.6 12.0 - 14.6 sec    INR 1.05 0.87 - 1.13   APTT   Result Value Ref Range    APTT 27.6 24.7 - 36.0 sec   LIPASE   Result Value Ref Range    Lipase 30 11 - 82 U/L   proBrain Natriuretic Peptide, NT   Result Value Ref Range    NT-proBNP 1504 (H) 0 - 125 pg/mL   TROPONIN   Result Value Ref Range    Troponin T 19 6 - 19 ng/L   ESTIMATED GFR   Result Value Ref Range    GFR (CKD-EPI) 68 >60 mL/min/1.73 m 2   EKG (NOW)   Result Value Ref Range    Report       [facility] Emergency Dept[facility]    Test Date:  2023  Pt Name:    CARSON LUKE                  Department: ER  MRN:        [id number]                      Room:       [facility]  Gender:     Female                       Technician: [id number]  :        1934                   Requested By:TILA LOW  Order #:    [id number]                    Reading MD: Tila Low    Measurements  Intervals                                Axis  Rate:       63                           P:          0  DC:         0                            QRS:        16  QRSD:       76                           T:          31  QT:         415  QTc:        425    Interpretive Statements  Atrial fibrillation  Left ventricular hypertrophy  Anterior Q waves, possibly due to LVH  No  previous ECG available for comparison  Electronically Signed On 08- 01:56:12 PDT by Tila Colmenares         Radiology  CT-HEAD W/O   Final Result         1.  No acute intracranial abnormality is identified, there are nonspecific white matter changes, commonly associated with small vessel ischemic disease.  Associated mild cerebral atrophy is noted.   2.  Atherosclerosis.         DX-CHEST-PORTABLE (1 VIEW)   Final Result         1.  No acute cardiopulmonary disease.   2.  Cardiomegaly   3.  Atherosclerosis          Medications:   New Prescriptions    No medications on file       My final assessment includes bedside evaluation, discussion with , discussion with patient's , chart review.    Upon Reevaluation, the patient's condition has: Improved; and will be discharged.    Patient discharged from ED Observation status at 11:39 AM (Time) 8/18/2023 (Date).     Total time spent on this ED Observation discharge encounter is > 30 Minutes    Electronically signed by: Julio Bellamy M.D., 8/18/2023 11:40 AM

## 2023-08-18 NOTE — ED NOTES
Taken patient from triage waiting room, per WC.Verified patient identification.  Assumed patient care.   Placed on patient room. Changed clothes to hospital gown. Connected to cardiac monitor.   Given the call light and instructed to call for any assistance needed/ or concerns.   Bed on lowest position, side rails up, breaks locked. Awaiting for ERP.

## 2023-08-18 NOTE — THERAPY
Physical Therapy   Initial Evaluation     Patient Name: Aleah Wiggins  Age:  89 y.o., Sex:  female  Medical Record #: [id number]  Today's Date: 8/18/2023     Precautions  Precautions: Fall Risk    Assessment  Pt is an 89 year old female who presented with the ED for incontinence and weakness. Pt has a history of dementia and is A&Ox1 at baseline.     Pt was received at EOB from OT who was at the end of her OT evaluation. Pt was able to ambulate with steady gait with FWW with SBA. Pt presented with difficulty with command following and sequencing, this is most likely caused by her cognitive deficits. No acute needs at this time. HH recommended to help make suggestions or improvements in safety in home environment.     Plan    Physical Therapy Initial Treatment Plan   Duration: Discharge Needs Only    DC Equipment Recommendations: None  Discharge Recommendations: Recommend home health for continued physical therapy services        Objective     08/18/23 1013   Initial Contact Note    Initial Contact Note Order Received and Verified, Physical Therapy Evaluation in Progress with Full Report to Follow.   Precautions   Precautions Fall Risk   Prior Living Situation   Housing / Facility 1 Story House   Lives with - Patient's Self Care Capacity Spouse   Comments Unable to obtain history from patient.   Prior Level of Functional Mobility   Bed Mobility Independent   Transfer Status Independent   Ambulation Independent   Ambulation Distance household   Assistive Devices Used Front-Wheel Walker   Stairs Independent   Cognition    Cognition / Consciousness X   Orientation Level Not Oriented to Age;Not Oriented to Address;Not Oriented to Month;Not Oriented to Year;Not Oriented to Day;Not Oriented to Time;Not Oriented to Reason;Not Oriented to Place   Passive ROM Lower Body   Passive ROM Lower Body WDL   Active ROM Lower Body    Active ROM Lower Body  WDL   Strength Lower Body   Lower Body Strength  WDL   Lower Body Muscle Tone    Lower Body Muscle Tone  WDL   Coordination Lower Body    Coordination Lower Body  WDL   Balance Assessment   Sitting Balance (Static) Fair +   Sitting Balance (Dynamic) Fair   Standing Balance (Static) Fair   Standing Balance (Dynamic) Fair   Weight Shift Sitting Fair   Weight Shift Standing Fair   Bed Mobility    Sit to Supine Maximal Assist   Comments pt required max a for sit to sup due to inability to understand/follow command being given.   Gait Analysis   Gait Level Of Assist Standby Assist   Assistive Device Front Wheel Walker   Distance (Feet) 100   # of Times Distance was Traveled 1   Deviation Bradykinetic   Weight Bearing Status no restrictions   Functional Mobility   Sit to Stand Standby Assist   Bed, Chair, Wheelchair Transfer Standby Assist   Mobility gait   How much difficulty does the patient currently have...   Turning over in bed (including adjusting bedclothes, sheets and blankets)? 3   Sitting down on and standing up from a chair with arms (e.g., wheelchair, bedside commode, etc.) 3   Moving from lying on back to sitting on the side of the bed? 3   How much help from another person does the patient currently need...   Moving to and from a bed to a chair (including a wheelchair)? 3   Need to walk in a hospital room? 3   Climbing 3-5 steps with a railing? 3   6 clicks Mobility Score 18   Activity Tolerance   Standing 10 min   Education Group   Education Provided Role of Physical Therapist   Role of Physical Therapist Patient Response Patient;Acceptance;Explanation;Reinforcement Needed   Physical Therapy Initial Treatment Plan    Duration Discharge Needs Only   Anticipated Discharge Equipment and Recommendations   DC Equipment Recommendations None   Discharge Recommendations Recommend home health for continued physical therapy services   Interdisciplinary Plan of Care Collaboration   IDT Collaboration with  ;Nursing   Patient Position at End of Therapy Bed Alarm On;In Bed;Call Light  within Reach;Robel Table within Reach   Collaboration Comments RN updated   Session Information   Date / Session Number  8/18- DC needs

## 2023-08-18 NOTE — ED NOTES
Discharge instructions discussed with pt's , verbalizes understanding. PIV removed. All belongings with pt when leaving unit. Pt amb to car by SHARI. Assisted into car.

## 2023-08-18 NOTE — ED NOTES
Breakfast tray delivered. Pt sitting upright in bed to eat. Assisted her with eating. Pt only took 2 bites and said she did not want anymore.

## 2023-08-18 NOTE — HOSPITAL COURSE
89 y.o. female with reported cognitive decline alert, awake, oriented to self only who presented 8/17/2023 with weakness and incontinence.  History is obtained entirely from chart review as patient is poor historian and only oriented to self.  In the ER, work-up did not reveal any indication for admission.  UA negative.  Hospital medicine consulted for weakness and subsequent placement.  PT/OT ordered by ER physician.

## 2023-08-23 LAB
BACTERIA BLD CULT: NORMAL
BACTERIA BLD CULT: NORMAL
SIGNIFICANT IND 70042: NORMAL
SIGNIFICANT IND 70042: NORMAL
SITE SITE: NORMAL
SITE SITE: NORMAL
SOURCE SOURCE: NORMAL
SOURCE SOURCE: NORMAL